# Patient Record
Sex: FEMALE | Race: WHITE | URBAN - METROPOLITAN AREA
[De-identification: names, ages, dates, MRNs, and addresses within clinical notes are randomized per-mention and may not be internally consistent; named-entity substitution may affect disease eponyms.]

---

## 2017-07-31 DIAGNOSIS — Z30.41 ENCOUNTER FOR SURVEILLANCE OF CONTRACEPTIVE PILLS: ICD-10-CM

## 2017-08-04 RX ORDER — DROSPIRENONE AND ETHINYL ESTRADIOL 0.03MG-3MG
1 KIT ORAL DAILY
Qty: 28 TABLET | Refills: 0 | Status: SHIPPED | OUTPATIENT
Start: 2017-08-04 | End: 2017-08-24

## 2017-08-04 NOTE — TELEPHONE ENCOUNTER
drospirenone-ethinyl estradiol (SHANICE) 3-0.03 MG per tablet 84 tablet 3 9/2/2016  No   Sig: Take 1 tablet by mouth daily     Last Office Visit with [facility], [facility] or [facility] prescribing provider: 09-02-16 WWE with LUIS Dunaway CNP  Phone call to patient and scheduled per request as below:  Next 5 appointments (look out 90 days)     Sep 11, 2017  5:30 PM CDT   PHYSICAL with LUIS Grullon CNP   [facility]    [address] [address] [phone]                Medication is being filled for 1 time refill only due to:  Patient needs to be seen because as noted above.   Sandra Mcrae RN, BAN

## 2017-08-24 ENCOUNTER — TELEPHONE (OUTPATIENT)
Dept: OBGYN | Facility: CLINIC | Age: 32
End: 2017-08-24

## 2017-08-24 DIAGNOSIS — Z30.41 ENCOUNTER FOR SURVEILLANCE OF CONTRACEPTIVE PILLS: ICD-10-CM

## 2017-08-24 NOTE — TELEPHONE ENCOUNTER
Patient is calling because she states she needs refill on her birth control before her appointment on 9.11.17. Please follow up with patient

## 2017-08-28 RX ORDER — DROSPIRENONE AND ETHINYL ESTRADIOL 0.03MG-3MG
1 KIT ORAL DAILY
Qty: 28 TABLET | Refills: 0 | Status: SHIPPED | OUTPATIENT
Start: 2017-08-28

## 2017-08-28 NOTE — TELEPHONE ENCOUNTER
drospirenone-ethinyl estradiol (SHANICE) 3-0.03 MG per tablet 28 tablet 0 8/4/2017  No   Sig: Take 1 tablet by mouth daily Patient needs to be seen prior to further refills.        Last Office Visit with [facility], [facility] or [facility] prescribing provider: 09-02-16 WWE with LUIS Dunaway, [place]                                         Next 5 appointments (look out 90 days)     Sep 11, 2017  5:30 PM CDT   PHYSICAL with LUIS Grullon CNP   [facility]    [address] [address] [phone]               Prescription approved per [facility] Refill Protocol.  Sandra Mcrae RN, BAN

## 2017-09-19 NOTE — PATIENT INSTRUCTIONS
Your last pap smear was on 6/24/15. Your result was negative for intraepithelial lesion or malignancy. You are due for your next pap smear in June 2018.    Please return in 1 year for medication review. Return sooner if any concerns.

## 2017-09-20 ENCOUNTER — OFFICE VISIT (OUTPATIENT)
Dept: FAMILY MEDICINE | Facility: CLINIC | Age: 32
End: 2017-09-20
Payer: COMMERCIAL

## 2017-09-20 VITALS
BODY MASS INDEX: 19.85 KG/M2 | HEART RATE: 54 BPM | HEIGHT: 68 IN | OXYGEN SATURATION: 98 % | TEMPERATURE: 98.3 F | WEIGHT: 131 LBS | SYSTOLIC BLOOD PRESSURE: 100 MMHG | DIASTOLIC BLOOD PRESSURE: 67 MMHG

## 2017-09-20 DIAGNOSIS — Z30.41 ENCOUNTER FOR SURVEILLANCE OF CONTRACEPTIVE PILLS: ICD-10-CM

## 2017-09-20 PROCEDURE — 99385 PREV VISIT NEW AGE 18-39: CPT | Performed by: PHYSICIAN ASSISTANT

## 2017-09-20 RX ORDER — DROSPIRENONE AND ETHINYL ESTRADIOL 0.03MG-3MG
1 KIT ORAL DAILY
Qty: 28 TABLET | Refills: 0 | Status: CANCELLED | OUTPATIENT
Start: 2017-09-20

## 2017-09-20 RX ORDER — DROSPIRENONE AND ETHINYL ESTRADIOL 0.03MG-3MG
1 KIT ORAL DAILY
Qty: 84 TABLET | Refills: 3 | Status: SHIPPED | OUTPATIENT
Start: 2017-09-20

## 2017-09-20 NOTE — PROGRESS NOTES
SUBJECTIVE:   CC: Jennie Trivedi is an 32 year old woman who presents for preventive health visit.     Physical   Annual:     Getting at least 3 servings of Calcium per day::  Yes    Bi-annual eye exam::  NO    Dental care twice a year::  Yes    Sleep apnea or symptoms of sleep apnea::  None    Diet::  Regular (no restrictions), Gluten-free/reduced and Other    Frequency of exercise::  4-5 days/week    Duration of exercise::  30-45 minutes    Taking medications regularly::  Yes    Medication side effects::  None    Additional concerns today::  No    She just had a physical through [facility] in April. She is here for a refill of her birth control. She denies any other concerns.   Today's PHQ-2 Score:   PHQ-2 (  [employer]) 2017   Q1: Little interest or pleasure in doing things 0   Q2: Feeling down, depressed or hopeless 0   PHQ-2 Score 0   Q1: Little interest or pleasure in doing things Not at all   Q2: Feeling down, depressed or hopeless Not at all   PHQ-2 Score 0       Abuse: Current or Past(Physical, Sexual or Emotional)- no  Do you feel safe in your environment - Yes    Social History   Substance Use Topics     Smoking status: Never Smoker     Smokeless tobacco: Never Used     Alcohol use No     The patient does not drink >3 drinks per day nor >7 drinks per week.    Reviewed orders with patient.  Reviewed health maintenance and updated orders accordingly - Yes  BP Readings from Last 3 Encounters:   17 100/67   16 106/68   06/24/15 123/72    Wt Readings from Last 3 Encounters:   17 131 lb (59.4 kg)   16 139 lb 6.4 oz (63.2 kg)   06/24/15 141 lb 3.2 oz (64 kg)                  There is no problem list on file for this patient.    Past Surgical History:   Procedure Laterality Date      SECTION      x2       Social History   Substance Use Topics     Smoking status: Never Smoker     Smokeless tobacco: Never Used     Alcohol use No     Family History   Problem Relation Age of Onset  "    Hypertension Maternal Grandmother          Current Outpatient Prescriptions   Medication Sig Dispense Refill     drospirenone-ethinyl estradiol (SHANICE) 3-0.03 MG per tablet Take 1 tablet by mouth daily Patient needs to be seen prior to further refills. (Patient not taking: Reported on 9/20/2017) 28 tablet 0     No Known Allergies    Mammogram not appropriate for this patient based on age.    Pertinent mammograms are reviewed under the imaging tab.  History of abnormal Pap smear: NO - age 30- 65 PAP every 3 years recommended    Reviewed and updated as needed this visit by clinical staff  Allergies  Meds         Reviewed and updated as needed this visit by Provider              ROS:  C: NEGATIVE for fever, chills, change in weight  I: NEGATIVE for worrisome rashes, moles or lesions  E: NEGATIVE for vision changes or irritation  ENT: NEGATIVE for ear, mouth and throat problems  R: NEGATIVE for significant cough or SOB  B: NEGATIVE for masses, tenderness or discharge  CV: NEGATIVE for chest pain, palpitations or peripheral edema  GI: NEGATIVE for nausea, abdominal pain, heartburn, or change in bowel habits  : NEGATIVE for unusual urinary or vaginal symptoms. Periods are regular.  M: NEGATIVE for significant arthralgias or myalgia  N: NEGATIVE for weakness, dizziness or paresthesias  P: NEGATIVE for changes in mood or affect     OBJECTIVE:   /67  Pulse 54  Temp 98.3  F (36.8  C) (Oral)  Ht 5' 8\" (1.727 m)  Wt 131 lb (59.4 kg)  SpO2 98%  BMI 19.92 kg/m2  EXAM:  GENERAL: healthy, alert and no distress  EYES: Eyes grossly normal to inspection, PERRL and conjunctivae and sclerae normal  HENT: ear canals and TM's normal, nose and mouth without ulcers or lesions  NECK: no adenopathy, no asymmetry, masses, or scars and thyroid normal to palpation  RESP: lungs clear to auscultation - no rales, rhonchi or wheezes  BREAST: deferred  CV: regular rate and rhythm, normal S1 S2, no S3 or S4, no murmur, click or " rub, no peripheral edema and peripheral pulses strong  ABDOMEN: soft, nontender, no hepatosplenomegaly, no masses and bowel sounds normal  MS: no gross musculoskeletal defects noted, no edema  SKIN: no suspicious lesions or rashes  NEURO: Normal strength and tone, mentation intact and speech normal  PSYCH: mentation appears normal, affect normal/bright    ASSESSMENT/PLAN:       ICD-10-CM    1. Encounter for surveillance of contraceptive pills Z30.41 drospirenone-ethinyl estradiol (SHANICE) 3-0.03 MG per tablet     Patient Instructions   Your last pap smear was on 6/24/15. Your result was negative for intraepithelial lesion or malignancy. You are due for your next pap smear in June 2018.    Please return in 1 year for medication review. Return sooner if any concerns.         Rita Amin PA-C  [facility] [place]  Answers for HPI/ROS submitted by the patient on 9/20/2017   PHQ-2 Score: 0

## 2017-09-20 NOTE — NURSING NOTE
"Chief Complaint   Patient presents with     Physical       Initial /67  Pulse 54  Temp 98.3  F (36.8  C) (Oral)  Ht 5' 8\" (1.727 m)  Wt 131 lb (59.4 kg)  SpO2 98%  BMI 19.92 kg/m2 Estimated body mass index is 19.92 kg/(m^2) as calculated from the following:    Height as of this encounter: 5' 8\" (1.727 m).    Weight as of this encounter: 131 lb (59.4 kg).  Medication Reconciliation: complete  Aminah Cleary M.A.    "

## 2017-09-20 NOTE — MR AVS SNAPSHOT
"              After Visit Summary   9/20/2017    Jennie Trivedi    MRN: [id number]           Patient Information     Date Of Birth          1985        Visit Information        Provider Department      9/20/2017 6:00 PM Rita Amin PA-C [facility]        Today's Diagnoses     Encounter for surveillance of contraceptive pills          Care Instructions    Your last pap smear was on 6/24/15. Your result was negative for intraepithelial lesion or malignancy. You are due for your next pap smear in June 2018.    Please return in 1 year for medication review. Return sooner if any concerns.             Follow-ups after your visit        Who to contact     If you have questions or need follow up information about today's clinic visit or your schedule please contact [facility] directly at [phone].  Normal or non-critical lab and imaging results will be communicated to you by MyChart, letter or phone within 4 business days after the clinic has received the results. If you do not hear from us within 7 days, please contact the clinic through MyChart or phone. If you have a critical or abnormal lab result, we will notify you by phone as soon as possible.  Submit refill requests through [employer] or call your pharmacy and they will forward the refill request to us. Please allow 3 business days for your refill to be completed.          Additional Information About Your Visit        MyChart Information     [employer] lets you send messages to your doctor, view your test results, renew your prescriptions, schedule appointments and more. To sign up, go to [email or web address] . Click on \"Log in\" on the left side of the screen, which will take you to the Welcome page. Then click on \"Sign up Now\" on the right side of the page.     You will be asked to enter the access code listed below, as well as some personal information. Please follow the directions to create your username and " "password.     Your access code is: [email or web address]  Expires: 2017  6:29 PM     Your access code will  in 90 days. If you need help or a new code, please call your [place] clinic or [phone].        Care EveryWhere ID     This is your Care EveryWhere ID. This could be used by other organizations to access your [place] medical records  [id number]        Your Vitals Were     Pulse Temperature Height Pulse Oximetry BMI (Body Mass Index)       54 98.3  F (36.8  C) (Oral) 5' 8\" (1.727 m) 98% 19.92 kg/m2        Blood Pressure from Last 3 Encounters:   17 100/67   16 106/68   06/24/15 123/72    Weight from Last 3 Encounters:   17 131 lb (59.4 kg)   16 139 lb 6.4 oz (63.2 kg)   06/24/15 141 lb 3.2 oz (64 kg)              Today, you had the following     No orders found for display         Today's Medication Changes          These changes are accurate as of: 17  6:29 PM.  If you have any questions, ask your nurse or doctor.               These medicines have changed or have updated prescriptions.        Dose/Directions    * drospirenone-ethinyl estradiol 3-0.03 MG per tablet   Commonly known as:  SHANICE   This may have changed:  Another medication with the same name was added. Make sure you understand how and when to take each.   Used for:  Encounter for surveillance of contraceptive pills   Changed by:  Shraddha Humphreys APRN CNP        Dose:  1 tablet   Take 1 tablet by mouth daily Patient needs to be seen prior to further refills.   Quantity:  28 tablet   Refills:  0       * drospirenone-ethinyl estradiol 3-0.03 MG per tablet   Commonly known as:  SHANICE   This may have changed:  You were already taking a medication with the same name, and this prescription was added. Make sure you understand how and when to take each.   Used for:  Encounter for surveillance of contraceptive pills   Changed by:  Rita Amin PA-C        Dose:  1 tablet   Take 1 tablet by mouth " daily   Quantity:  84 tablet   Refills:  3       * Notice:  This list has 2 medication(s) that are the same as other medications prescribed for you. Read the directions carefully, and ask your doctor or other care provider to review them with you.         Where to get your medicines      These medications were sent to [facility] [address] [address] [facility] - [place], [place] - [address] [address]  [address] [address]     Phone:  [phone]     drospirenone-ethinyl estradiol 3-0.03 MG per tablet                Primary Care Provider Office Phone # Fax #    [facility] [phone] [address] [facility] [address]        Equal Access to Services     CONSTANTINO ACEVEDO : Meseret Lindsey, wamarceda giovanni, qaybta kaalmada fransico, aron machuca. So [facility] [phone].    ATENCIÓN: Si habla español, tiene a grant disposición servicios gratuitos de asistencia lingüística. Llame al [phone].    We comply with applicable federal civil rights laws and [place] laws. We do not discriminate on the basis of race, color, national origin, age, disability sex, sexual orientation or gender identity.            Thank you!     Thank you for choosing [facility]  for your care. Our goal is always to provide you with excellent care. Hearing back from our patients is one way we can continue to improve our services. Please take a few minutes to complete the written survey that you may receive in the mail after your visit with us. Thank you!             Your Updated Medication List - Protect others around you: Learn how to safely use, store and throw away your medicines at www.disposemymeds.org.          This list is accurate as of: 9/20/17  6:29 PM.  Always use your most recent med list.                   Brand Name Dispense Instructions for use Diagnosis    * drospirenone-ethinyl estradiol 3-0.03 MG per tablet    SHANICE    28 tablet    Take 1  tablet by mouth daily Patient needs to be seen prior to further refills.    Encounter for surveillance of contraceptive pills       * drospirenone-ethinyl estradiol 3-0.03 MG per tablet    SHANICE    84 tablet    Take 1 tablet by mouth daily    Encounter for surveillance of contraceptive pills       * Notice:  This list has 2 medication(s) that are the same as other medications prescribed for you. Read the directions carefully, and ask your doctor or other care provider to review them with you.